# Patient Record
Sex: MALE | Race: WHITE | Employment: OTHER | URBAN - METROPOLITAN AREA
[De-identification: names, ages, dates, MRNs, and addresses within clinical notes are randomized per-mention and may not be internally consistent; named-entity substitution may affect disease eponyms.]

---

## 2017-08-17 ENCOUNTER — APPOINTMENT (OUTPATIENT)
Dept: ULTRASOUND IMAGING | Facility: HOSPITAL | Age: 32
End: 2017-08-17
Payer: MEDICAID

## 2017-08-17 ENCOUNTER — HOSPITAL ENCOUNTER (EMERGENCY)
Facility: HOSPITAL | Age: 32
Discharge: HOME | End: 2017-08-18
Payer: MEDICAID

## 2017-08-17 VITALS
DIASTOLIC BLOOD PRESSURE: 88 MMHG | SYSTOLIC BLOOD PRESSURE: 132 MMHG | TEMPERATURE: 98 F | HEIGHT: 67 IN | HEART RATE: 82 BPM | WEIGHT: 162 LBS | BODY MASS INDEX: 25.43 KG/M2 | OXYGEN SATURATION: 99 % | RESPIRATION RATE: 18 BRPM

## 2017-08-17 DIAGNOSIS — S86.811A STRAIN OF CALF MUSCLE, RIGHT, INITIAL ENCOUNTER: Primary | ICD-10-CM

## 2017-08-17 PROCEDURE — 99284 EMERGENCY DEPT VISIT MOD MDM: CPT

## 2017-08-17 PROCEDURE — 93971 EXTREMITY STUDY: CPT

## 2017-08-17 PROCEDURE — 99283 EMERGENCY DEPT VISIT LOW MDM: CPT

## 2017-08-17 RX ORDER — CHLORAL HYDRATE 500 MG
1000 CAPSULE ORAL DAILY
COMMUNITY

## 2017-08-17 RX ORDER — GLUCOSAMINE HCL 500 MG
TABLET ORAL
COMMUNITY
End: 2018-05-17

## 2017-08-17 RX ORDER — DOXEPIN HYDROCHLORIDE 50 MG/1
1 CAPSULE ORAL DAILY
COMMUNITY

## 2017-08-18 NOTE — ED INITIAL ASSESSMENT (HPI)
Patient c/o right calf pain that began after traveling last week. No redness or swelling noted. Patient c/o soreness and some tingling down into his toes.

## 2017-08-18 NOTE — ED PROVIDER NOTES
Patient Seen in: [facility] Emergency Department    History   Patient presents with:  Deep Vein Thrombosis (cardiovascular)    Stated Complaint: r/o DVT    HPI    53-year-old male with a history of asthma, presents the emergency department for compl • Lipids Mother    • Sonda Ese Mother    • Cancer Mother      COLON   • Hypertension Maternal Grandmother    • Diabetes Maternal Grandmother      PRE-DM   • VASQUES'S[other] [OTHER] Maternal Grandmother    • [other] Wilhemena Fuel Paternal Meenane Jaswinder Disposition and Plan     Clinical Impression:  Strain of calf muscle, right, initial encounter  (primary encounter diagnosis)    Disposition:  Discharge    Follow-up:  Maria Antonia Antonio MD  [id number] [id number]

## 2018-04-06 PROCEDURE — 81003 URINALYSIS AUTO W/O SCOPE: CPT | Performed by: FAMILY MEDICINE

## (undated) NOTE — ED AVS SNAPSHOT
Paula Rios   MRN: [id number]    Department:  [facility] Emergency Department   Date of Visit:  8/17/2017           Disclosure     Insurance plans vary and the physician(s) referred by the ER may not be covered by your plan.  Please contact If you have been prescribed any medication(s), please fill your prescription right away and begin taking the medication(s) as directed    If the emergency physician has read X-rays, these will be re-interpreted by a radiologist.  If there is a significant